# Patient Record
Sex: FEMALE | Race: OTHER | HISPANIC OR LATINO | Employment: UNEMPLOYED | URBAN - METROPOLITAN AREA
[De-identification: names, ages, dates, MRNs, and addresses within clinical notes are randomized per-mention and may not be internally consistent; named-entity substitution may affect disease eponyms.]

---

## 2021-04-15 ENCOUNTER — OFFICE VISIT CONVERTED (OUTPATIENT)
Dept: UROLOGY | Facility: CLINIC | Age: 36
End: 2021-04-15
Attending: NURSE PRACTITIONER

## 2021-04-15 ENCOUNTER — HOSPITAL ENCOUNTER (OUTPATIENT)
Dept: SURGERY | Facility: CLINIC | Age: 36
Discharge: HOME | End: 2021-04-15
Attending: NURSE PRACTITIONER

## 2021-04-15 LAB
BILIRUB UR QL STRIP: NEGATIVE
COLOR UR: YELLOW
CONV CLARITY OF URINE: CLEAR
CONV PROTEIN IN URINE BY AUTOMATED TEST STRIP: NORMAL
CONV UROBILINOGEN IN URINE BY AUTOMATED TEST STRIP: NORMAL
GLUCOSE UR QL: NEGATIVE
HGB UR QL STRIP: NORMAL
KETONES UR QL STRIP: NEGATIVE
LEUKOCYTE ESTERASE UR QL STRIP: NORMAL
NITRITE UR QL STRIP: NEGATIVE
PH UR STRIP.AUTO: 5.5 [PH]
SP GR UR: 1.02

## 2021-04-17 LAB
AMPICILLIN SUSC ISLT: >=32
AMPICILLIN+SULBAC SUSC ISLT: >=32
BACTERIA UR CULT: ABNORMAL
CEFAZOLIN SUSC ISLT: <=4
CEFEPIME SUSC ISLT: <=0.12
CEFTAZIDIME SUSC ISLT: <=1
CEFTRIAXONE SUSC ISLT: <=0.25
CIPROFLOXACIN SUSC ISLT: <=0.25
ERTAPENEM SUSC ISLT: <=0.12
GENTAMICIN SUSC ISLT: <=1
LEVOFLOXACIN SUSC ISLT: <=0.12
NITROFURANTOIN SUSC ISLT: <=16
PIP+TAZO SUSC ISLT: <=4
TMP SMX SUSC ISLT: >=320
TOBRAMYCIN SUSC ISLT: <=1

## 2021-05-11 NOTE — H&P
"   History and Physical      Patient Name: Johanna Elizabeth   Patient ID: [id number]   Sex: Female   YOB: 1985        Visit Date: April 15, 2021    Provider: KEL Parks   [facility]   Location Address: [address] [place] [address]  [id number]   Location Phone: [phone]          Chief Complaint     Recurrent UTI       History Of Present Illness  The patient is a 35 year old female , who is a consultation from [facility] urgent care, for a recurrent UTI.   Symptoms are frequency, bladder discomfort, and dysuria. She is moderately bothered by the symptoms. There are no additional symptoms. The following aggravating factors are noted: intercourse. The patient is constipated.   She has had prior treatment. Temporary relief has been obtained with antibiotics. The most recent antibiotic was taken 4 weeks ago. Prior evaluation has not been done.   She has no significant prior  problems.      she drinks about 16oz of water a day           Past Medical History  Allergic rhinitis, chronic; Asthma; Bladder disorder; Blood Diseases         Past Surgical History  Ablation; Spleen Removal         Medication List  Advair -21 mcg/actuation inhalation HFA aerosol inhaler; albuterol sulfate 0.63 mg/3 mL inhalation solution for nebulization; Zyrtec oral         Allergy List  NO KNOWN DRUG ALLERGIES       Allergies Reconciled  Family Medical History  Diabetes, unspecified type         Social History  Tobacco (Never)         Review of Systems  · Constitutional  o Denies  o : fever, chills  · Gastrointestinal  o Denies  o : nausea, vomiting      Vitals  Date Time BP Position Site L\R Cuff Size HR RR TEMP (F) WT  HT  BMI kg/m2 BSA m2 O2 Sat FR L/min FiO2        04/15/2021 10:30 AM       12  206lbs 4oz 5'  1\" 38.97 2.01             Physical Examination  · Constitutional  o Appearance  o : Well nourished, well developed patient in no acute distress. Ambulating " without difficulty.  · Neck  o Thyroid  o : Normal size without tenderness, nodules or masses  · Respiratory  o Respiratory Effort  o : Breathing is unlabored without accessory muscle use  · Gastrointestinal  o Abdominal Examination  o : Scaphoid abdomen which is non-tender to palpation with normal tone and without rigidity or guarding. Normal bowel sounds. No masses present.  o Liver and spleen  o : No hepatomegaly present. Liver is non-tender to palpation and spleen is not palpable.  · Lymphatic  o Groin  o : No lymphadenopathy present  · Skin and Subcutaneous Tissue  o General Inspection  o : No rashes, lesions or areas of discoloration present. Skin turgor is normal.          Results  · In-Office Procedures  o Surgical procedure  § IOP - Bladder Scan/Residual Urine (68230)   § Specimen vol Ur: 84   o Lab procedure  § Automated dipstick urinalysis with microscopy (05803)   § Color Ur: Yellow   § Clarity Ur: Clear   § Glucose Ur Ql Strip: Negative   § Bilirub Ur Ql Strip: Negative   § Ketones Ur Ql Strip: Negative   § Sp Gr Ur Qn: 1.025   § Hgb Ur Ql Strip: Large   § pH Ur-LsCnc: 5.5   § Prot Ur Ql Strip: 30 mg/dL   § Urobilinogen Ur Strip-mCnc: 0.2 E.U./dL   § Nitrite Ur Ql Strip: Negative   § WBC Est Ur Ql Strip: Trace       Assessment  · Recurrent UTI     599.0/N39.0      Plan  · Orders  o Urine Culture (Clean Catch) [facility] (53658) - 599.0/N39.0 - 04/15/2021  · Medications  o Medications have been Reconciled  o Transition of Care or Provider Policy  · Instructions  o DISCUSSION:  o The patient appears to have recurrent UTI's. I have recommended increasing fluid intake and utilizing preventative medications post coital (UQORA target), as well as Uqora control to help remove bladder biofilm. Discussed with patient that her intermittent constipation could be a contributing factor to her recurrent urinary tract infections and that increasing her water intake may help to alleviate this as well  o PLAN: Follow-up in 3  months  o Urine culture  o Void after intercourse and wipe front to back when using the restroom  o Avoid bladder irritants  o Timed voiding q 3-4 hours  o Start post-coital prophylaxis.  o Double voiding  o Avoid tub baths, tampons, spermicides, vaginal douching, IUD use  o Encourage intake of yogurt and cranberry tablets/juice  o The patient is advised to call for any signs or symptoms of a UTI.  o Electronically Identified Patient Education Materials Provided Electronically  · Referrals  o ID: [id number] Date: 04/15/2021 Type: Inbound  Specialty: Urology            Electronically Signed by: KEL Parks -Author on April 15, 2021 11:10:21 AM

## 2021-05-14 VITALS — WEIGHT: 206.25 LBS | RESPIRATION RATE: 12 BRPM | BODY MASS INDEX: 38.94 KG/M2 | HEIGHT: 61 IN

## 2021-06-15 ENCOUNTER — OFFICE VISIT (OUTPATIENT)
Dept: UROLOGY | Facility: CLINIC | Age: 36
End: 2021-06-15

## 2021-06-15 VITALS — WEIGHT: 212.2 LBS | BODY MASS INDEX: 40.06 KG/M2 | RESPIRATION RATE: 16 BRPM | HEIGHT: 61 IN

## 2021-06-15 DIAGNOSIS — R30.0 DYSURIA: Primary | ICD-10-CM

## 2021-06-15 DIAGNOSIS — R31.0 GROSS HEMATURIA: ICD-10-CM

## 2021-06-15 LAB
BILIRUB BLD-MCNC: NEGATIVE MG/DL
CLARITY, POC: CLEAR
COLOR UR: YELLOW
GLUCOSE UR STRIP-MCNC: NEGATIVE MG/DL
KETONES UR QL: NEGATIVE
LEUKOCYTE EST, POC: NEGATIVE
NITRITE UR-MCNC: NEGATIVE MG/ML
PH UR: 7 [PH] (ref 5–8)
PROT UR STRIP-MCNC: NEGATIVE MG/DL
RBC # UR STRIP: ABNORMAL /UL
SP GR UR: 1.02 (ref 1–1.03)
UROBILINOGEN UR QL: NORMAL

## 2021-06-15 PROCEDURE — 81003 URINALYSIS AUTO W/O SCOPE: CPT | Performed by: NURSE PRACTITIONER

## 2021-06-15 PROCEDURE — 99212 OFFICE O/P EST SF 10 MIN: CPT | Performed by: NURSE PRACTITIONER

## 2021-06-15 RX ORDER — CETIRIZINE HYDROCHLORIDE 10 MG/1
CAPSULE, LIQUID FILLED ORAL
COMMUNITY

## 2021-06-15 RX ORDER — ALBUTEROL SULFATE 0.63 MG/3ML
SOLUTION RESPIRATORY (INHALATION)
COMMUNITY

## 2021-06-15 RX ORDER — FLUTICASONE PROPIONATE AND SALMETEROL XINAFOATE 115; 21 UG/1; UG/1
AEROSOL, METERED RESPIRATORY (INHALATION)
COMMUNITY

## 2021-06-15 NOTE — PROGRESS NOTES
"Chief Complaint: Cystitis (follow up had 2 utis since last apt. want to know why she is still getting them)    Subjective         History of Present Illness  Johanna Elizabeth is a 35 y.o. female presents to [facility] to be seen for f/u on recurrent UTIs .    She has had 3 UTIs since we last saw her.     She is asymptomatic at this time.     She tried Uqora supplements without relief.     She states that she has dysuria and bleeding and low back pain when she gets these infections.     I do not have records for her UTIs to confirm if these are actual infecitons.      Objective     Past Medical History:   Diagnosis Date   • Asthma    • Bladder disorder    • Blood disease    • Chronic allergic rhinitis    • Recurrent UTI 04/15/2021       Past Surgical History:   Procedure Laterality Date   • LASER ABLATION     • SPLENECTOMY           Current Outpatient Medications:   •  albuterol (ACCUNEB) 0.63 MG/3ML nebulizer solution, albuterol sulfate 0.63 mg/3 mL inhalation solution for nebulization use in nebulizer as directed  As needed   Active, Disp: , Rfl:   •  Cetirizine HCl (ZyrTEC Allergy) 10 MG capsule, , Disp: , Rfl:   •  fluticasone-salmeterol (Advair HFA) 115-21 MCG/ACT inhaler, Advair -21 mcg/actuation inhalation HFA aerosol inhaler inhale 2 puffs by inhalation route 2 times per day in the morning and evening   Active, Disp: , Rfl:     No Known Allergies     Family History   Problem Relation Age of Onset   • Diabetes Mother        Social History     Socioeconomic History   • Marital status:      Spouse name: Not on file   • Number of children: Not on file   • Years of education: Not on file   • Highest education level: Not on file   Tobacco Use   • Smoking status: Never Smoker   • Smokeless tobacco: Never Used       Vital Signs:   Resp 16   Ht 154.9 cm (61\")   Wt 96.3 kg (212 lb 3.2 oz)   BMI 40.09 kg/m²      Physical Exam     Result Review :   The following data was " reviewed by: KEL Mcarthur on 06/15/2021:  Results for orders placed or performed in visit on 06/15/21   POC Urinalysis Dipstick, Automated    Specimen: Urine   Result Value Ref Range    Color Yellow Yellow, Straw, Dark Yellow, Miryam    Clarity, UA Clear Clear    Specific Gravity  1.020 1.005 - 1.030    pH, Urine 7.0 5.0 - 8.0    Leukocytes Negative Negative    Nitrite, UA Negative Negative    Protein, POC Negative Negative mg/dL    Glucose, UA Negative Negative, 1000 mg/dL (3+) mg/dL    Ketones, UA Negative Negative    Urobilinogen, UA Normal Normal    Bilirubin Negative Negative    Blood, UA Trace (A) Negative          Procedures        Assessment and Plan    Diagnoses and all orders for this visit:    1. Dysuria (Primary)  -     POC Urinalysis Dipstick, Automated    2. Gross hematuria  -     CT Abdomen Pelvis With & Without Contrast; Future    Discussed with patient that given her gross hematuria and unknown if she has urinary tract infections or not at this point in time I believe it is good to be beneficial to perform a diagnostic work-up to delineate the etiology of her symptoms.  We will attempt to contact her PCM to get her urinalysis results.  Patient informed that from here on out if she could call the office for an outpatient urine culture with any signs or symptoms of a UTI would be more beneficial as we would be able to keep track of these infections and/or symptoms.  Did discuss with patient as well the possibility of interstitial cystitis as a diagnosis given that her symptoms were present without an infection in January of this year.      I spent 15 minutes caring for Johanna on this date of service. This time includes time spent by me in the following activities:reviewing tests, obtaining and/or reviewing a separately obtained history, performing a medically appropriate examination and/or evaluation , counseling and educating the patient/family/caregiver, ordering medications, tests, or  procedures, and documenting information in the medical record  Follow Up   Return in about 4 weeks (around 7/13/2021) for f/u CT .  Patient was given instructions and counseling regarding her condition or for health maintenance advice. Please see specific information pulled into the AVS if appropriate.

## 2021-06-29 ENCOUNTER — APPOINTMENT (OUTPATIENT)
Dept: CT IMAGING | Facility: HOSPITAL | Age: 36
End: 2021-06-29

## 2021-07-13 ENCOUNTER — HOSPITAL ENCOUNTER (OUTPATIENT)
Dept: CT IMAGING | Facility: HOSPITAL | Age: 36
Discharge: HOME | End: 2021-07-13
Admitting: NURSE PRACTITIONER

## 2021-07-13 DIAGNOSIS — R31.0 GROSS HEMATURIA: ICD-10-CM

## 2021-07-13 PROCEDURE — 74178 CT ABD&PLV WO CNTR FLWD CNTR: CPT

## 2021-07-13 PROCEDURE — 0 IOPAMIDOL PER 1 ML: Performed by: NURSE PRACTITIONER

## 2021-07-13 RX ADMIN — IOPAMIDOL 100 ML: 755 INJECTION, SOLUTION INTRAVENOUS at 08:57

## 2021-07-30 ENCOUNTER — HOSPITAL ENCOUNTER (OUTPATIENT)
Dept: MRI IMAGING | Facility: HOSPITAL | Age: 36
Discharge: HOME | End: 2021-07-30
Admitting: NURSE PRACTITIONER

## 2021-07-30 DIAGNOSIS — R31.0 GROSS HEMATURIA: ICD-10-CM

## 2021-07-30 DIAGNOSIS — D21.9 LEIOMYOMA: Primary | ICD-10-CM

## 2021-07-30 PROCEDURE — 72197 MRI PELVIS W/O & W/DYE: CPT

## 2021-07-30 PROCEDURE — 0 GADOBENATE DIMEGLUMINE 529 MG/ML SOLUTION: Performed by: NURSE PRACTITIONER

## 2021-07-30 PROCEDURE — A9577 INJ MULTIHANCE: HCPCS | Performed by: NURSE PRACTITIONER

## 2021-07-30 RX ADMIN — GADOBENATE DIMEGLUMINE 20 ML: 529 INJECTION, SOLUTION INTRAVENOUS at 09:13

## 2021-08-02 NOTE — PROGRESS NOTES
Procedures       Urinalysis was checked today and was negative for signs of infection      Cytoscopy Procedure:     Procedure: Flexible cytoscope was passed per urethra into the bladder without difficulty after proper consent. The bladder was inspected in a systematic meridian fashion.     Normal bladder.  She did have a bulge posteriorly from a lot of constipation most likely into the bladder.    There were no tumors, lesions, stones, or other abnormalities noted within the bladder. Of note, there was no increased vascularity as well. Both ureteral orifices were identified and were normal in appearance. The flexible cytoscope was removed. The patient tolerated the procedure well.       7/30/2021 MRI pelvis with and without-4 x 3.5 x 2.5 cm oval circumscribed mass dependent lower right pelvis.  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  Continue dual dysplasia of the sacrum.  Urinary bladder is normal.    7/13/2021 CT urogram-2 mm nonobstructing stone mid right kidney.  Questionable soft tissue irregularity on the left posterior aspect of the urinary bladder.  Near the left UVJ.  Congenital gastrointestinal malrotation.  Abnormal positioning of the cecum within the superior midline of the abdomen majority: The left hemiabdomen.  Ovoid structure in the presacral space about 4.6 x 2.4 cm.  Demonstrates enhancement.  Delayed phase okay.    PLAN    Records and CT reviewed with the patient    Patient does deal with constipation and only has a bowel movement about every 5 to 7 days.  Discussed she should start on MiraLAX or Metamucil make sure she drinks 5 to 7 glasses of water a day.  We discussed she should shoot for a soft bowel movement daily.  We did discuss this could help her overall health but also decrease her risk of urinary tract infections and bladder dysfunction.  Patient voiced understanding    Patient has a mass in the pelvis that I do want her to see gynecology, we will make sure she has a referral today.   Patient was given records she understands not getting this followed up could be detrimental to her health or cause death  - patient voiced understanding    [place] a [place] and she will [place] there      This document has been electronically signed by Ac Perez MD  August 2, 2021 15:57 EDT

## 2021-08-03 ENCOUNTER — OFFICE VISIT (OUTPATIENT)
Dept: UROLOGY | Facility: CLINIC | Age: 36
End: 2021-08-03

## 2021-08-03 DIAGNOSIS — R31.0 GROSS HEMATURIA: Primary | ICD-10-CM

## 2021-08-03 PROBLEM — J45.909 ASTHMA: Status: ACTIVE | Noted: 2021-08-03

## 2021-08-03 PROBLEM — N39.0 RECURRENT UTI: Status: ACTIVE | Noted: 2021-04-15

## 2021-08-03 PROBLEM — N32.9 BLADDER DISORDER: Status: ACTIVE | Noted: 2021-08-03

## 2021-08-03 PROCEDURE — 52000 CYSTOURETHROSCOPY: CPT | Performed by: UROLOGY
